# Patient Record
(demographics unavailable — no encounter records)

---

## 2025-07-01 NOTE — HISTORY OF PRESENT ILLNESS
[FreeTextEntry1] : 21 y/o male otherwise healthy presents for fertility eval wife Juliette is 32,  for 1 year, has been trying to conceive for 1 year wife has not undergone fertility evaluation no previous children no difficulty obtaining erections firm enough for intercourse, however reports difficulty maintaining erections, low volume ejaculate SA done last year, was told it was normal denies alcohol, smoking, or drug use SA: 2.1 ml/88.5 mil/ml/72% motility/4% morph

## 2025-07-01 NOTE — PHYSICAL EXAM
[General Appearance - In No Acute Distress] : no acute distress [Urethral Meatus] : meatus normal [Penis Abnormality] : normal uncircumcised penis [Scrotum] : the scrotum was normal [Rectal Exam - Seminal Vesicles] : the seminal vesicles were normal [Epididymis] : the epididymides were normal [Testes Tenderness] : no tenderness of the testes [Testes Mass (___cm)] : there were no testicular masses [Oriented To Time, Place, And Person] : oriented to person, place, and time [de-identified] : ~12cc testes, vas palpable, no varicoceles bilaterally

## 2025-07-01 NOTE — ASSESSMENT
[FreeTextEntry1] : hypogonadism healthy semen parameters TT FSH LH E2 consider repeat SA results by phone

## 2025-07-01 NOTE — PHYSICAL EXAM
[General Appearance - In No Acute Distress] : no acute distress [Urethral Meatus] : meatus normal [Penis Abnormality] : normal uncircumcised penis [Scrotum] : the scrotum was normal [Rectal Exam - Seminal Vesicles] : the seminal vesicles were normal [Epididymis] : the epididymides were normal [Testes Tenderness] : no tenderness of the testes [Testes Mass (___cm)] : there were no testicular masses [Oriented To Time, Place, And Person] : oriented to person, place, and time [de-identified] : ~12cc testes, vas palpable, no varicoceles bilaterally